# Patient Record
Sex: FEMALE | Race: WHITE | URBAN - METROPOLITAN AREA
[De-identification: names, ages, dates, MRNs, and addresses within clinical notes are randomized per-mention and may not be internally consistent; named-entity substitution may affect disease eponyms.]

---

## 2017-02-08 ENCOUNTER — THERAPY VISIT (OUTPATIENT)
Dept: OCCUPATIONAL THERAPY | Facility: CLINIC | Age: 52
End: 2017-02-08
Payer: OTHER MISCELLANEOUS

## 2017-02-08 DIAGNOSIS — S60.222D CONTUSION OF LEFT HAND, SUBSEQUENT ENCOUNTER: ICD-10-CM

## 2017-02-08 DIAGNOSIS — M79.642 PAIN OF LEFT HAND: Primary | ICD-10-CM

## 2017-02-08 PROCEDURE — 97110 THERAPEUTIC EXERCISES: CPT | Mod: GO | Performed by: OCCUPATIONAL THERAPIST

## 2017-02-08 PROCEDURE — 97165 OT EVAL LOW COMPLEX 30 MIN: CPT | Mod: GO | Performed by: OCCUPATIONAL THERAPIST

## 2017-02-08 NOTE — PROGRESS NOTES
Hand Therapy Initial Evaluation    Current Date:  2/8/2017    Subjective:  Rachelle Woo is a 51 year old left hand dominant female.    Diagnosis:   Left hand contusion  DOI:  12/29/16    Patient reports symptoms of pain, stiffness/loss of motion, weakness/loss of strength, edema, numbness and tingling  of the left wrist and hand which occurred due to hand being crushed between freezer door and baker rack at work. Since onset symptoms are gradually getting better.  Special tests:  x-ray negative for fracture.  Previous treatment: Resting splint for hand and wrist x 1 months, now taping ring and small fingers for work.  General health as reported by patient is fair.  Pertinent medical history includes:overweight, thyroid problems, smoking  Medical allergies:none.  Surgical history: other.  Medication history: thyroid, sleep, stool softener.    Current occupation is [employer]   Currently working in normal job without restrictions  Job Tasks: prolonged sitting, operating a machine/assembly, prolonged standing, lifting/carrying, pushing/pulling, repetitive tasks  Barriers include:none  Prior functional level:  no limitations    Additional Occupational Profile Information (patterns of daily living, interests, values and needs): None    Functional Outcome Measure:  Upper Extremity Functional Index  SCORE:   Column Totals: 63/80  (A lower score indicates greater disability.)    ROM:  AROM(PROM) 2/8/17    Left   Ring MP 0/55   PIP 0/105   DIP 0/45   Small MP 0/45   PIP 0/95   DIP 0/50     Wrist  2/8/17   AROM(PROM) Right Right   Extension 65 55   Flexion 75 65   RD 25 25   UD 45 35     Strength:   (Measured in pounds)    2/8/17   Trials Right Left   1  2  3 53  54  55 15+  10+  13+   Average: 54 13     Edema:  Mild diffuse swelling of fingers and hand    Sensation:  Decreased intermittently in Ulnar Nerve distribution per patient report    Pain Report:  VAS(0-10) 2/8/17   At Rest: 0/10   With Use: 5/10   Worst: 10/10    Location:  Ulnar wrist and R, S fingers  Pain Quality:  Sharp  Frequency: intermittent    Pain is worst:  daytime  Exacerbated by:  Lifting, gripping, catching finger on something  Relieved by:  NSAIDs  Progression:  Gradually improving  Assessment:  Patient presents with symptoms consistent with diagnosis of left hand contusion, with conservative intervention.     Patient's limitations or Problem List includes:  Pain, Decreased ROM/motion, Increased edema, Weakness, Sensory disturbance and Decreased  of the left wrist and hand which interferes with the patient's ability to perform Self Care Tasks (dressing, eating), Work Tasks, Sleep Patterns, Recreational Activities and Household Chores as compared to previous level of function.    Rehab Potential:  Excellent - Return to full activity, no limitations    Patient will benefit from skilled Occupational Therapy to increase ROM, flexibility, overall strength,  strength and sensation and decrease pain and edema to return to previous activity level and resume normal daily tasks and to reach their rehab potential.    Barriers to Learning:  No barrier    Communication Issues:  Patient appears to be able to clearly communicate and understand verbal and written communication and follow directions correctly.    Assessment of Occupational Performance:  1-3 Performance Deficits  Identified Performance Deficits: hygiene and grooming, driving and community mobility, home establishment and management, meal preparation and cleanup, sleep, work and leisure activities      Clinical Decision Making (Complexity): Low complexity    Treatment Explanation:  The following has been discussed with the patient:  RX ordered/plan of care  Anticipated outcomes  Possible risks and side effects    Plan:  Frequency:  1 X week, once daily  Duration:  for 6 weeks  Treatment Plan:    Modalities:  US, Fluidotherapy and Paraffin  Therapeutic Exercise:  AROM, AAROM, PROM, Tendon Gliding,  Extensor Tracking, Isotonics, Isometrics and Stabilization  Neuromuscular re-education:  Nerve Gliding, Desensitization and Kinesiotaping  Manual Techniques:  Joint mobilization and Myofascial release  Orthotic Fabrication:  Finger based dynamic orthosis  Self Care:  Self Care Tasks and Ergonomic Considerations    Discharge Plan:  Achieve all LTG.  Independent in home treatment program.  Reach maximal therapeutic benefit.    Home Exercise Program:  Warmth for stiffness  Ice/cold after activity for pain or swelling  AROM wrist all planes  Tendon gliding with 3 fists  Gentle  strengthening with stress ball  Trappers at P1 ring and small fingers as needed with activity  Encourage functional use of all fingers of left hand    Next Visit:  See in 1 week  Consider paraffin or fluidotherapy for stiffness  Progress to wrist isometrics/isotonics as tolerated

## 2017-02-08 NOTE — Clinical Note
[facility] [place]  [address]  [place] [address]  [phone]    February 10, 2017    Re: Rachelle Woo   :   1965  MRN:  [id number]   REFERRING PHYSICIAN:   Bonnie Kee    [facility] [place]  Date of Initial Evaluation:  2017  Visits:  Rxs Used: 1  Reason for Referral:     Pain of left hand  Contusion of left hand, subsequent encounter    Hand Therapy Initial Evaluation  Current Date:  2017    Subjective:  Rachelle Woo is a 51 year old left hand dominant female.  Diagnosis:   Left hand contusion  DOI:  16. Patient reports symptoms of pain, stiffness/loss of motion, weakness/loss of strength, edema, numbness and tingling  of the left wrist and hand which occurred due to hand being crushed between freezer door and baker rack at work. Since onset symptoms are gradually getting better.  Special tests:  x-ray negative for fracture.  Previous treatment: Resting splint for hand and wrist x 1 months, now taping ring and small fingers for work.  General health as reported by patient is fair.  Pertinent medical history includes:overweight, thyroid problems, smoking  Medical allergies:none.  Surgical history: other.  Medication history: thyroid, sleep, stool softener. Current occupation is [employer].  Currently working in normal job without restrictions  Job Tasks: prolonged sitting, operating a machine/assembly, prolonged standing, lifting/carrying, pushing/pulling, repetitive tasks  Barriers include:none  Prior functional level:  no limitations  Additional Occupational Profile Information (patterns of daily living, interests, values and needs): None  Functional Outcome Measure:  Upper Extremity Functional Index  SCORE: Column Totals: 63/80  (A lower score indicates greater disability.)    ROM:  AROM(PROM) 17    Left   Ring MP 0/55   PIP 0/105   DIP 0/45   Small MP 0/45   PIP 0/95   DIP  050     Re: Rachelle Woo   :   1965    Wrist  17   AROM(PROM) Right Right   Extension 65 55   Flexion 75 65   RD 25 25   UD 45 35     Strength:   (Measured in pounds)    17   Trials Right Left   1  2  3 53  54  55 15+  10+  13+   Average: 54 13     Edema:  Mild diffuse swelling of fingers and hand  Sensation:  Decreased intermittently in Ulnar Nerve distribution per patient report    Pain Report:  VAS(0-10) 17   At Rest: 0/10   With Use: 5/10   Worst: 10/10   Location:  Ulnar wrist and R, S fingers  Pain Quality:  Sharp  Frequency: intermittent    Pain is worst:  daytime  Exacerbated by:  Lifting, gripping, catching finger on something  Relieved by:  NSAIDs  Progression:  Gradually improvingAssessment: Patient presents with symptoms consistent with diagnosis of left hand contusion, with conservative intervention. Patient's limitations or Problem List includes:  Pain, Decreased ROM/motion, Increased edema, Weakness, Sensory disturbance and Decreased  of the left wrist and hand which interferes with the patient's ability to perform Self Care Tasks (dressing, eating), Work Tasks, Sleep Patterns, Recreational Activities and Household Chores as compared to previous level of function. Rehab Potential:  Excellent - Return to full activity, no limitations. Patient will benefit from skilled Occupational Therapy to increase ROM, flexibility, overall strength,  strength and sensation and decrease pain and edema to return to previous activity level and resume normal daily tasks and to reach their rehab potential.  Barriers to Learning:  No barrier  Communication Issues:  Patient appears to be able to clearly communicate and understand verbal and written communication and follow directions correctly.  Assessment of Occupational Performance:  1-3 Performance Deficits  Identified Performance Deficits: hygiene and grooming, driving and community mobility, home establishment and management, meal  preparation and cleanup, sleep, work and leisure activities  Clinical Decision Making (Complexity): Low complexity  Treatment Explanation:  The following has been discussed with the patient:  RX ordered/plan of care. Anticipated outcomes  Possible risks and side effects  Re: Rachelle Woo   :   1965    Plan:  Frequency:  1 X week, once daily  Duration:  for 6 weeks  Treatment Plan:    Modalities:  US, Fluidotherapy and Paraffin  Therapeutic Exercise:  AROM, AAROM, PROM, Tendon Gliding, Extensor Tracking, Isotonics, Isometrics and Stabilization  Neuromuscular re-education:  Nerve Gliding, Desensitization and Kinesiotaping  Manual Techniques:  Joint mobilization and Myofascial release  Orthotic Fabrication:  Finger based dynamic orthosis  Self Care:  Self Care Tasks and Ergonomic Considerations  Discharge Plan:  Achieve all LTG.  Independent in home treatment program.  Reach maximal therapeutic benefit.  Home Exercise Program:  Warmth for stiffness  Ice/cold after activity for pain or swelling  AROM wrist all planes  Tendon gliding with 3 fists  Gentle  strengthening with stress ball  Trappers at P1 ring and small fingers as needed with activity  Encourage functional use of all fingers of left hand  Next Visit:  See in 1 week  Consider paraffin or fluidotherapy for stiffness  Progress to wrist isometrics/isotonics as tolerated               Thank you for your referral.              INQUIRIES  Therapist: Estefany Manzo OT  [facility] [place]  [address] [address]  [place] [address]  Phone: [phone]  Fax: [phone]

## 2017-02-22 ENCOUNTER — THERAPY VISIT (OUTPATIENT)
Dept: OCCUPATIONAL THERAPY | Facility: CLINIC | Age: 52
End: 2017-02-22
Payer: OTHER MISCELLANEOUS

## 2017-02-22 DIAGNOSIS — S60.222D CONTUSION OF LEFT HAND, SUBSEQUENT ENCOUNTER: ICD-10-CM

## 2017-02-22 DIAGNOSIS — M79.642 PAIN OF LEFT HAND: ICD-10-CM

## 2017-02-22 PROCEDURE — 97110 THERAPEUTIC EXERCISES: CPT | Mod: GO | Performed by: OCCUPATIONAL THERAPIST

## 2017-02-22 NOTE — PROGRESS NOTES
SOAP note objective information for 2/22/2017:    ROM:  AROM(PROM) 2/8/17 2/22/17    Left Left   Ring MP 0/55 /70   PIP 0/105 /95   DIP 0/45 /45   Small MP 0/45 /60   PIP 0/95 /90   DIP 0/50 /50     Wrist  2/8/17 2/22/17   AROM(PROM) Right Right Left   Extension 65 55 65   Flexion 75 65 70   RD 25 25    UD 45 35      Strength:   (Measured in pounds)    2/8/17 2/22/17   Trials Right Left Left   1  2  3 53  54  55 15+  10+  13+ 15  22  16   Average: 54 13 18     Pain Report:  VAS(0-10) 2/8/17 2/22/17   At Rest: 0/10    With Use: 5/10    Worst: 10/10 6/10   Location:  Ulnar wrist and R, S fingers    Home Exercise Program:  Warmth for stiffness  Ice/cold after activity for pain or swelling  AROM wrist all planes  Tendon gliding with 3 fists   strengthening with stress ball  Wrist E/F isotonics  Trappers at P1 ring and small fingers as needed with activity  Encourage functional use of all fingers of left hand    Next Visit:  See in 1 week  Assess response to wrist isotonics    Please refer to the daily flowsheet for treatment today, total treatment time and time spent performing 1:1 timed codes.

## 2017-02-22 NOTE — MR AVS SNAPSHOT
"              After Visit Summary   2017    Rachelle Woo    MRN: [id number]           Patient Information     Date Of Birth          1965        Visit Information        Provider Department      2017 8:30 AM Estefany Manzo [facility] [place]        Today's Diagnoses     Pain of left hand        Contusion of left hand, subsequent encounter           Follow-ups after your visit        Who to contact     If you have questions or need follow up information about today's clinic visit or your schedule please contact [facility] [place] directly at [phone].  Normal or non-critical lab and imaging results will be communicated to you by [employer], letter or phone within 4 business days after the clinic has received the results. If you do not hear from us within 7 days, please contact the clinic through [employer] or phone. If you have a critical or abnormal lab result, we will notify you by phone as soon as possible.  Submit refill requests through [employer] or call your pharmacy and they will forward the refill request to us. Please allow 3 business days for your refill to be completed.          Additional Information About Your Visit        MyChart Information     [employer] lets you send messages to your doctor, view your test results, renew your prescriptions, schedule appointments and more. To sign up, go to [email or web address] . Click on \"Log in\" on the left side of the screen, which will take you to the Welcome page. Then click on \"Sign up Now\" on the right side of the page.     You will be asked to enter the access code listed below, as well as some personal information. Please follow the directions to create your username and password.     Your access code is: [email or web address]  Expires: 2017  8:54 AM     Your access code will  in 90 days. If you need help or a new code, please call your [facility] clinic or [phone].      "   Care EveryWhere ID     This is your Care EveryWhere ID. This could be used by other organizations to access your [facility] medical records  [id number]         Blood Pressure from Last 3 Encounters:   No data found for BP    Weight from Last 3 Encounters:   No data found for Wt              We Performed the Following     THERAPEUTIC EXERCISES        Primary Care Provider Office Phone # Fax #    Kaleb Murillo [phone]       [facility] [address] [address]        Thank you!     Thank you for choosing [facility] [place]  for your care. Our goal is always to provide you with excellent care. Hearing back from our patients is one way we can continue to improve our services. Please take a few minutes to complete the written survey that you may receive in the mail after your visit with us. Thank you!             Your Updated Medication List - Protect others around you: Learn how to safely use, store and throw away your medicines at www.disposemymeds.org.      Notice  As of 2/22/2017  8:54 AM    You have not been prescribed any medications.

## 2017-02-27 ENCOUNTER — THERAPY VISIT (OUTPATIENT)
Dept: OCCUPATIONAL THERAPY | Facility: CLINIC | Age: 52
End: 2017-02-27
Payer: OTHER MISCELLANEOUS

## 2017-02-27 DIAGNOSIS — S60.222D CONTUSION OF LEFT HAND, SUBSEQUENT ENCOUNTER: ICD-10-CM

## 2017-02-27 DIAGNOSIS — M79.642 PAIN OF LEFT HAND: ICD-10-CM

## 2017-02-27 PROCEDURE — 97110 THERAPEUTIC EXERCISES: CPT | Mod: GO | Performed by: OCCUPATIONAL THERAPIST

## 2017-02-27 NOTE — PROGRESS NOTES
SOAP note objective information for 2/27/2017:    ROM:  AROM(PROM) 2/8/17 2/22/17 2/27/17    Left Left Left   Ring MP 0/55 /70 /80   PIP 0/105 /95 /105   DIP 0/45 /45 /45   Small MP 0/45 /60 /90   PIP 0/95 /90 /90   DIP 0/50 /50 /55     Wrist  2/8/17 2/22/17 2/27/17   AROM(PROM) Right Right Left Left   Extension 65 55 65 65   Flexion 75 65 70 75   RD 25 25  25   UD 45 35  35     Strength:   (Measured in pounds)    2/8/17 2/22/17 2/27/17   Trials Right Left Left Left   1  2  3 53  54  55 15+  10+  13+ 15  22  16 20  15  14   Average: 54 13 18      Pain Report:  VAS(0-10) 2/8/17 2/22/17 2/27/17   At Rest: 0/10     With Use: 5/10     Worst: 10/10 6/10 5/10   Location:  Ulnar wrist and R, S fingers    Home Exercise Program:  Warmth for stiffness  Ice/cold after activity for pain or swelling  AROM wrist all planes  Tendon gliding with 3 fists  Finger Add strengthening   strengthening with stress ball  Wrist E/F isotonics  Trappers at P1 ring and small fingers as needed with activity  Encourage functional use of all fingers of left hand    Next Visit:  See in 2 weeks  Assess response to finger adduction strengthening  Progress Report and UEFI  Anticipate D/C to HEP      Please refer to the daily flowsheet for treatment today, total treatment time and time spent performing 1:1 timed codes.

## 2017-02-27 NOTE — MR AVS SNAPSHOT
"              After Visit Summary   2017    Rachelle Woo    MRN: [id number]           Patient Information     Date Of Birth          1965        Visit Information        Provider Department      2017 9:00 AM Estefany Manzo [facility] [place]        Today's Diagnoses     Pain of left hand        Contusion of left hand, subsequent encounter           Follow-ups after your visit        Who to contact     If you have questions or need follow up information about today's clinic visit or your schedule please contact [facility] [place] directly at [phone].  Normal or non-critical lab and imaging results will be communicated to you by "[employer]hart, letter or phone within 4 business days after the clinic has received the results. If you do not hear from us within 7 days, please contact the clinic through [employer] or phone. If you have a critical or abnormal lab result, we will notify you by phone as soon as possible.  Submit refill requests through [employer] or call your pharmacy and they will forward the refill request to us. Please allow 3 business days for your refill to be completed.          Additional Information About Your Visit        MyChart Information     [employer] lets you send messages to your doctor, view your test results, renew your prescriptions, schedule appointments and more. To sign up, go to [email or web address] . Click on \"Log in\" on the left side of the screen, which will take you to the Welcome page. Then click on \"Sign up Now\" on the right side of the page.     You will be asked to enter the access code listed below, as well as some personal information. Please follow the directions to create your username and password.     Your access code is: [email or web address]  Expires: 2017  8:54 AM     Your access code will  in 90 days. If you need help or a new code, please call your [facility] clinic or [phone].      "   Care EveryWhere ID     This is your Care EveryWhere ID. This could be used by other organizations to access your [place] medical records  [id number]         Blood Pressure from Last 3 Encounters:   No data found for BP    Weight from Last 3 Encounters:   No data found for Wt              We Performed the Following     THERAPEUTIC EXERCISES        Primary Care Provider Office Phone # Fax #    Kaleb Murillo [phone]       [facility] [address] [address] [address]        Thank you!     Thank you for choosing [facility] [place]  for your care. Our goal is always to provide you with excellent care. Hearing back from our patients is one way we can continue to improve our services. Please take a few minutes to complete the written survey that you may receive in the mail after your visit with us. Thank you!             Your Updated Medication List - Protect others around you: Learn how to safely use, store and throw away your medicines at www.disposemymeds.org.      Notice  As of 2/27/2017  9:28 AM    You have not been prescribed any medications.

## 2017-05-01 PROBLEM — M79.642 PAIN OF LEFT HAND: Status: RESOLVED | Noted: 2017-02-08 | Resolved: 2017-05-01

## 2017-05-01 PROBLEM — S60.222D CONTUSION OF LEFT HAND, SUBSEQUENT ENCOUNTER: Status: RESOLVED | Noted: 2017-02-08 | Resolved: 2017-05-01

## 2017-05-01 NOTE — PROGRESS NOTES
Discharge Summary - Hand Therapy  Pt has not returned for therapy.  Assume all goals met to pt satisfaction.  D/C Hand Therapy.

## 2017-09-24 ENCOUNTER — HEALTH MAINTENANCE LETTER (OUTPATIENT)
Age: 52
End: 2017-09-24